# Patient Record
Sex: MALE | Race: WHITE | HISPANIC OR LATINO
[De-identification: names, ages, dates, MRNs, and addresses within clinical notes are randomized per-mention and may not be internally consistent; named-entity substitution may affect disease eponyms.]

---

## 2017-05-17 ENCOUNTER — TRANSCRIPTION ENCOUNTER (OUTPATIENT)
Age: 42
End: 2017-05-17

## 2017-05-17 PROBLEM — Z00.00 ENCOUNTER FOR PREVENTIVE HEALTH EXAMINATION: Status: ACTIVE | Noted: 2017-05-17

## 2017-05-23 ENCOUNTER — APPOINTMENT (OUTPATIENT)
Dept: CT IMAGING | Facility: CLINIC | Age: 42
End: 2017-05-23

## 2022-07-10 ENCOUNTER — EMERGENCY (EMERGENCY)
Facility: HOSPITAL | Age: 47
LOS: 1 days | Discharge: HOME | End: 2022-07-10
Attending: EMERGENCY MEDICINE
Payer: COMMERCIAL

## 2022-07-10 VITALS
RESPIRATION RATE: 16 BRPM | OXYGEN SATURATION: 99 % | WEIGHT: 205.03 LBS | SYSTOLIC BLOOD PRESSURE: 144 MMHG | HEIGHT: 68 IN | TEMPERATURE: 98 F | DIASTOLIC BLOOD PRESSURE: 90 MMHG | HEART RATE: 78 BPM

## 2022-07-10 PROCEDURE — ZZZZZ: CPT

## 2022-07-10 PROCEDURE — 99285 EMERGENCY DEPT VISIT HI MDM: CPT

## 2022-07-10 PROCEDURE — 99222 1ST HOSP IP/OBS MODERATE 55: CPT

## 2022-07-10 PROCEDURE — 99281 EMR DPT VST MAYX REQ PHY/QHP: CPT

## 2022-07-10 NOTE — PROCEDURE NOTE - ADDITIONAL PROCEDURE DETAILS
The area was prepped and draped in the standard sterile fashion with Betadine.  The sutures were removed.  Devitalized skin and subcutaneous tissues down to the level of the adductor muscle were debrided carefully with a scalpel.  A small amount of pus was expressed.  It was then irrigated with 1 L of normal saline.  It was packed with iodoform pack and wrapped with gauze Alexandra and an Ace bandage.  He tolerated the procedure well.

## 2022-07-10 NOTE — ED ADULT TRIAGE NOTE - CHIEF COMPLAINT QUOTE
pt had carpal tunnel surgery done last week, now incision site red, swollen and draining pus  started on keflex 2 days ago by urgent care

## 2022-07-11 DIAGNOSIS — Z98.890 OTHER SPECIFIED POSTPROCEDURAL STATES: Chronic | ICD-10-CM

## 2022-07-11 LAB
ALBUMIN SERPL ELPH-MCNC: 4.6 G/DL — SIGNIFICANT CHANGE UP (ref 3.3–5)
ALP SERPL-CCNC: 68 U/L — SIGNIFICANT CHANGE UP (ref 40–120)
ALT FLD-CCNC: 18 U/L — SIGNIFICANT CHANGE UP (ref 10–45)
ANION GAP SERPL CALC-SCNC: 11 MMOL/L — SIGNIFICANT CHANGE UP (ref 5–17)
APTT BLD: 34.4 SEC — SIGNIFICANT CHANGE UP (ref 27.5–35.5)
AST SERPL-CCNC: 24 U/L — SIGNIFICANT CHANGE UP (ref 10–40)
BASOPHILS # BLD AUTO: 0.06 K/UL — SIGNIFICANT CHANGE UP (ref 0–0.2)
BASOPHILS NFR BLD AUTO: 0.6 % — SIGNIFICANT CHANGE UP (ref 0–2)
BILIRUB SERPL-MCNC: 0.4 MG/DL — SIGNIFICANT CHANGE UP (ref 0.2–1.2)
BLD GP AB SCN SERPL QL: NEGATIVE — SIGNIFICANT CHANGE UP
BUN SERPL-MCNC: 21 MG/DL — SIGNIFICANT CHANGE UP (ref 7–23)
CALCIUM SERPL-MCNC: 9.1 MG/DL — SIGNIFICANT CHANGE UP (ref 8.4–10.5)
CHLORIDE SERPL-SCNC: 103 MMOL/L — SIGNIFICANT CHANGE UP (ref 96–108)
CO2 SERPL-SCNC: 25 MMOL/L — SIGNIFICANT CHANGE UP (ref 22–31)
CREAT SERPL-MCNC: 0.76 MG/DL — SIGNIFICANT CHANGE UP (ref 0.5–1.3)
CRP SERPL-MCNC: 25 MG/L — HIGH (ref 0–4)
EGFR: 113 ML/MIN/1.73M2 — SIGNIFICANT CHANGE UP
EOSINOPHIL # BLD AUTO: 0.27 K/UL — SIGNIFICANT CHANGE UP (ref 0–0.5)
EOSINOPHIL NFR BLD AUTO: 2.8 % — SIGNIFICANT CHANGE UP (ref 0–6)
ERYTHROCYTE [SEDIMENTATION RATE] IN BLOOD: 26 MM/HR — HIGH (ref 0–15)
GLUCOSE SERPL-MCNC: 115 MG/DL — HIGH (ref 70–99)
HCT VFR BLD CALC: 39.3 % — SIGNIFICANT CHANGE UP (ref 39–50)
HGB BLD-MCNC: 13.7 G/DL — SIGNIFICANT CHANGE UP (ref 13–17)
IMM GRANULOCYTES NFR BLD AUTO: 0.5 % — SIGNIFICANT CHANGE UP (ref 0–1.5)
INR BLD: 1 RATIO — SIGNIFICANT CHANGE UP (ref 0.88–1.16)
LYMPHOCYTES # BLD AUTO: 2.27 K/UL — SIGNIFICANT CHANGE UP (ref 1–3.3)
LYMPHOCYTES # BLD AUTO: 23.2 % — SIGNIFICANT CHANGE UP (ref 13–44)
MCHC RBC-ENTMCNC: 30.6 PG — SIGNIFICANT CHANGE UP (ref 27–34)
MCHC RBC-ENTMCNC: 34.9 GM/DL — SIGNIFICANT CHANGE UP (ref 32–36)
MCV RBC AUTO: 87.7 FL — SIGNIFICANT CHANGE UP (ref 80–100)
MONOCYTES # BLD AUTO: 0.76 K/UL — SIGNIFICANT CHANGE UP (ref 0–0.9)
MONOCYTES NFR BLD AUTO: 7.8 % — SIGNIFICANT CHANGE UP (ref 2–14)
NEUTROPHILS # BLD AUTO: 6.37 K/UL — SIGNIFICANT CHANGE UP (ref 1.8–7.4)
NEUTROPHILS NFR BLD AUTO: 65.1 % — SIGNIFICANT CHANGE UP (ref 43–77)
NRBC # BLD: 0 /100 WBCS — SIGNIFICANT CHANGE UP (ref 0–0)
PLATELET # BLD AUTO: 332 K/UL — SIGNIFICANT CHANGE UP (ref 150–400)
POTASSIUM SERPL-MCNC: 3.7 MMOL/L — SIGNIFICANT CHANGE UP (ref 3.5–5.3)
POTASSIUM SERPL-SCNC: 3.7 MMOL/L — SIGNIFICANT CHANGE UP (ref 3.5–5.3)
PROT SERPL-MCNC: 7.6 G/DL — SIGNIFICANT CHANGE UP (ref 6–8.3)
PROTHROM AB SERPL-ACNC: 11.5 SEC — SIGNIFICANT CHANGE UP (ref 10.5–13.4)
RBC # BLD: 4.48 M/UL — SIGNIFICANT CHANGE UP (ref 4.2–5.8)
RBC # FLD: 12.8 % — SIGNIFICANT CHANGE UP (ref 10.3–14.5)
RH IG SCN BLD-IMP: NEGATIVE — SIGNIFICANT CHANGE UP
RH IG SCN BLD-IMP: NEGATIVE — SIGNIFICANT CHANGE UP
SARS-COV-2 RNA SPEC QL NAA+PROBE: SIGNIFICANT CHANGE UP
SODIUM SERPL-SCNC: 139 MMOL/L — SIGNIFICANT CHANGE UP (ref 135–145)
WBC # BLD: 9.78 K/UL — SIGNIFICANT CHANGE UP (ref 3.8–10.5)
WBC # FLD AUTO: 9.78 K/UL — SIGNIFICANT CHANGE UP (ref 3.8–10.5)

## 2022-07-11 PROCEDURE — 73090 X-RAY EXAM OF FOREARM: CPT | Mod: 26,LT

## 2022-07-11 PROCEDURE — 99220: CPT

## 2022-07-11 PROCEDURE — 73110 X-RAY EXAM OF WRIST: CPT | Mod: 26,RT

## 2022-07-11 PROCEDURE — 71045 X-RAY EXAM CHEST 1 VIEW: CPT | Mod: 26

## 2022-07-11 PROCEDURE — 99203 OFFICE O/P NEW LOW 30 MIN: CPT

## 2022-07-11 RX ORDER — CEFAZOLIN SODIUM 1 G
2000 VIAL (EA) INJECTION ONCE
Refills: 0 | Status: COMPLETED | OUTPATIENT
Start: 2022-07-11 | End: 2022-07-11

## 2022-07-11 RX ORDER — CEFAZOLIN SODIUM 1 G
2000 VIAL (EA) INJECTION EVERY 8 HOURS
Refills: 0 | Status: DISCONTINUED | OUTPATIENT
Start: 2022-07-11 | End: 2022-07-11

## 2022-07-11 RX ORDER — LANOLIN ALCOHOL/MO/W.PET/CERES
5 CREAM (GRAM) TOPICAL AT BEDTIME
Refills: 0 | Status: DISCONTINUED | OUTPATIENT
Start: 2022-07-11 | End: 2022-07-14

## 2022-07-11 RX ORDER — VANCOMYCIN HCL 1 G
1000 VIAL (EA) INTRAVENOUS EVERY 8 HOURS
Refills: 0 | Status: DISCONTINUED | OUTPATIENT
Start: 2022-07-11 | End: 2022-07-12

## 2022-07-11 RX ADMIN — Medication 100 MILLIGRAM(S): at 04:35

## 2022-07-11 RX ADMIN — Medication 100 MILLIGRAM(S): at 03:40

## 2022-07-11 RX ADMIN — Medication 250 MILLIGRAM(S): at 14:36

## 2022-07-11 RX ADMIN — Medication 100 MILLIGRAM(S): at 06:40

## 2022-07-11 RX ADMIN — Medication 100 MILLIGRAM(S): at 12:12

## 2022-07-11 RX ADMIN — Medication 2000 MILLIGRAM(S): at 04:08

## 2022-07-11 RX ADMIN — Medication 250 MILLIGRAM(S): at 22:09

## 2022-07-11 NOTE — ED PROVIDER NOTE - PROGRESS NOTE DETAILS
Laurent PGY 2   Received sign out regarding patient, will follow up with labs, imaging.   Currently pending ortho recs will be admitted regardless for iv abx Laurent PGY 2 hosp rejected pt given post surgical complication     ortho rejected pt given current status states if needs more than 24 hour intervention will admit to ortho     will re-consult cdu for obs Laurent PGY 2 hosp rejected pt given post surgical complication     ortho rejected pt given current status states if needs more than 24 hour intervention will admit to ortho   Spoke w/ Dr Santhosh villavicencio     will re-consult cdu for obs

## 2022-07-11 NOTE — ED CDU PROVIDER DISPOSITION NOTE - NSFOLLOWUPINSTRUCTIONS_ED_ALL_ED_FT
1) Follow-up with your primary care provider in 1-2 days.      Follow-up with orthopedic hand surgery within 1 week.     Rosario Love; MPH)  Orthopaedic Surgery  [address] [facility], [address]  [place] [address]  Phone: [phone]  Fax: [phone]    2) Take Doxycycline 200mg as prescribed. Continue to take all medications as prescribed.    3) Rest and drink plenty of fluids. Pain can be managed with Acetaminophen (aka Tylenol) and Ibuprofen (aka Motrin or Advil) over the counter as directed. Take with food.    4) Return to the ER for any new or worsening symptoms. 1) Follow-up with your primary care provider in 2-3 days or our medicine clinic [phone]      Follow-up with your hand surgeon if in [place] or our orthopedic hand surgery within 1 week.     Rosario Love (MD; MPH)  Orthopaedic Surgery  [address] [place] [address], [address]  [place] [address]  Phone: [phone]  Fax: [phone]    2) Take Keflex 500mg 4x/day for 7 days. Continue to take all medications as prescribed.    3) Rest and drink plenty of fluids. Pain can be managed with Acetaminophen (aka Tylenol) and Ibuprofen (aka Motrin or Advil) over the counter as directed. Take with food.    4) Return to the ER for any new or worsening symptoms. return for fever, weakness, spreading redness and all other concerns.

## 2022-07-11 NOTE — ED CDU PROVIDER INITIAL DAY NOTE - PROGRESS NOTE DETAILS
pt seen by ID, rec only vanco 1g q8hr CDU PROGRESS NOTE PA SHYANN: Received pt at 1900 sign-out. Case/plan reviewed. VSS. s/p right wrist carpal tunnel surgery with postop wound infection, right forearm cellulitis. On exam, old dressing removed. No exposed bone or tendon noted. +Erythema over surgical site and up the forearm with notable swelling to the volar and dorsal aspects of the forearm. Swelling present to the forearm. No deep space infection to the hand, no erythema to the hand, no discharge. ID recs appreciated,   vancomycin 1 gm iv q8 for cover mrsa/strep, f/u blood and wound cx, Ortho following. CDU PROGRESS NOTE PA SHYANN: Received pt at 1900 sign-out. Case/plan reviewed. VSS. s/p right wrist carpal tunnel surgery with postop wound infection, right forearm cellulitis. On exam, old dressing removed. No exposed bone or tendon noted. +Erythema over surgical site and up the forearm with notable swelling to the volar and dorsal aspects of the forearm. Swelling present to the forearm. No deep space infection to the hand, no erythema to the hand, no discharge. ID recs appreciated, vancomycin 1 gm iv q8 for cover mrsa/strep, f/u blood and wound cx, Ortho following.

## 2022-07-11 NOTE — ED ADULT NURSE REASSESSMENT NOTE - NS ED NURSE REASSESS COMMENT FT1
10.00Am Received the Pt from  ANNIE Irene . Pt is Observed for Rt hand surgical site infection  Received the Pt A&OX 4 obeys commands Peg N/V/D fever chills cp SOB   Comfort care & safety measures continued  IV site looks clean & dry no signs of infiltration noted pt denies  pain IV site .  Pt is advised to call for help  call bell with in the reach pt verbalized the understanding . Pt states  he has minimal pain in Rt hand 4/10  Surgical site is wrapped in Ace wrap + swelling  pt able to wriggle the fingers  pending CDU  MD delatorre . GCS 15/15 A&OX 4 PERRLA  size 3 Strong upper & lower extremities steady gait   No facial droop  No Hand Leg drop denies numbness tingling Continue to monitor

## 2022-07-11 NOTE — ED PROVIDER NOTE - OBJECTIVE STATEMENT
Attending note (Daron): 46 y/o M with recent carpal tunnel release (7/5; Alcides Chester; @ in [place]), noted swelling of the wrist since yesterday, and then today noted pus coming out.  No fever.  Redness tracking up arm to almost elbow.  Visiting family in [place].  Went to urgent care center 2 days ago when it first started getting swollen and was prescribed keflex (500mg, taking it twice a day for past 2 days).    PMH: none  PSH: nasal surgery  allergies: nkda  nonsmoker

## 2022-07-11 NOTE — ED PROVIDER NOTE - PHYSICAL EXAMINATION
On Physical Exam:  General: well appearing, in NAD, speaking clearly in full sentences and without difficulty; cooperative with exam  HEENT: anicteric sclera,   Neck: no neck tenderness, no nuchal rigidity  Cardiac: normal s1, s2; RRR; no MGR  Lungs: CTABL  Abdomen: soft nontender/nondistended  : no bladder tenderness or distension  Skin: intact, no rash  Extremities: RUE: derythema tracking up the right medial palmar surface of forearm, induration/swelling, ? fluctuance of the right wrist at incision; purulent discharge xpressed with minimal palpation of right wrist.  Neuro: no gross neurologic deficits On Physical Exam:  General: well appearing, in NAD, speaking clearly in full sentences and without difficulty; cooperative with exam  HEENT: anicteric sclera,   Neck: no neck tenderness, no nuchal rigidity  Cardiac: normal s1, s2; RRR; no MGR  Lungs: CTABL  Abdomen: soft nontender/nondistended  : no bladder tenderness or distension  Skin: intact, no rash  Extremities: RUE: derythema tracking up the right medial palmar surface of forearm, induration/swelling, ? fluctuance of the right wrist at incision; purulent discharge expressed with minimal palpation of right wrist.  Neuro: no gross neurologic deficits

## 2022-07-11 NOTE — ED CDU PROVIDER INITIAL DAY NOTE - OBJECTIVE STATEMENT
Attending note (Daron): 48 y/o M with recent carpal tunnel release (6/28; Alcides Chester; @ in [place]), noted swelling of the wrist since yesterday, and then today noted pus coming out.  No fever.  Redness tracking up arm to almost elbow.  Visiting family in [place].  Went to urgent care center 2 days ago when it first started getting swollen and was prescribed keflex (500mg, taking it twice a day for past 2 days).  In ED, cbc/cmp wnl, crp 25, esr 26. No evidence of osteo or gas on xr. I&D done by ortho/hand. Given doxy and ancef in ED. CDU for iv abx, ID cx, hand following. RUE cellulitis outlined w/ skin marker upon arrival to CDU. 48 y/o M with recent carpal tunnel release (6/28; Alcides Chester; @ in [place]), noted swelling of the wrist since yesterday, and then today noted pus coming out.  No fever.  Redness tracking up arm to almost elbow.  Visiting family in [place].  Went to urgent care center 2 days ago when it first started getting swollen and was prescribed keflex (500mg, taking it twice a day for past 2 days).  In ED, cbc/cmp wnl, crp 25, esr 26. No evidence of osteo or gas on xr. I&D done by ortho/hand. Given doxy and ancef in ED. CDU for iv abx, ID cx, hand following. RUE cellulitis outlined w/ skin marker upon arrival to CDU.

## 2022-07-11 NOTE — CONSULT NOTE ADULT - SUBJECTIVE AND OBJECTIVE BOX
Source of information: patient    Reason for consult: infected surgical wound     HPI:  Patient is a 47y old male with no previous PMHx presents with a chief complaint of erythema, swelling and purulent discharge from the site of the carpal tunnel surgery incision site of his right arm that was done in [place] 6/28/22. Patient reports having a follow up with his orthopedics few days after his surgery when his bandage was removed. Patient came in to [place] on 7/3 for vacation, was feeling well until 2 days ago when he started noticing erythema and swelling around the site of the surgery. Patient went to urgent care 2 days ago when he was prescribed Keflex. Yesterday, patient started noticing purulent discharge from he site of the incision which prompted him to come to ER.     Denied any fever, chills, right arm numbness, weakness, or any other symptoms.   Denied any trauma to right arm, any known tick bite, any recent swimming. However, reports having outdoor barbecues during his stay in [place].   He denied smoking cigarettes, using any IV drugs.         REVIEW OF SYSTEMS  [  ] ROS unobtainable because:    [  ] All other systems negative except as noted below    Constitutional:  [ ] fever [ ] chills  [ ] weight loss  [ ]night sweat  [ ]poor appetite/PO intake [ ]fatigue   Skin:  [ ] rash [ ] phlebitis	  Eyes: [ ] icterus [ ] pain  [ ] discharge	  ENMT: [ ] sore throat  [ ] thrush [ ] ulcers [ ] exudates [ ]anosmia  Respiratory: [ ] dyspnea [ ] hemoptysis [ ] cough [ ] sputum	  Cardiovascular:  [ ] chest pain [ ] palpitations [ ] edema	  Gastrointestinal:  [ ] nausea [ ] vomiting [ ] diarrhea [ ] constipation [ ] pain	  Genitourinary:  [ ] dysuria [ ] frequency [ ] hematuria [ ] discharge [ ] flank pain  [ ] incontinence  Musculoskeletal:  [ ] myalgias [ ] arthralgias [ ] arthritis  [ ] back pain [x] right arm swelling, erythema and pain  Neurological:  [ ] headache [ ] weakness [ ] seizures  [ ] confusion/altered mental status    prior hospital charts reviewed [V]  primary team notes reviewed [V]  other consultant notes reviewed [V]    PAST MEDICAL & SURGICAL HISTORY:      SOCIAL HISTORY:  - Denied smoking/vaping/alcohol/recreational drug use    FAMILY HISTORY:      Allergies  No Known Allergies        ANTIMICROBIALS:  vancomycin  IVPB 1000 every 8 hours      ANTIMICROBIALS (past 90 days):  MEDICATIONS  (STANDING):  ceFAZolin   IVPB   100 mL/Hr IV Intermittent (07-11-22 @ 03:40)    ceFAZolin   IVPB   100 mL/Hr IV Intermittent (07-11-22 @ 12:12)    doxycycline IVPB   100 mL/Hr IV Intermittent (07-11-22 @ 04:35)        OTHER MEDS:   MEDICATIONS  (STANDING):      VITALS:  Vital Signs Last 24 Hrs  T(F): 98.4 (07-11-22 @ 12:31), Max: 98.4 (07-11-22 @ 12:31)    Vital Signs Last 24 Hrs  HR: 81 (07-11-22 @ 12:31) (67 - 81)  BP: 146/96 (07-11-22 @ 12:31) (144/90 - 157/91)  RR: 18 (07-11-22 @ 12:31)  SpO2: 97% (07-11-22 @ 12:31) (97% - 100%)  Wt(kg): --    EXAM:  Physical Exam:  Constitutional:  well preserved, comfortable  Head/Eyes: no icterus, PERRL, EOMI  ENT:  supple; no thrush  LUNGS:  CTA  CVS:  normal S1, S2, no murmur  Abd:  soft, non-tender; non-distended  Ext:  right wrist incision site surrounded with erythema and swelling. Erythema extended up to arm (borders marked)   Vascular:  IV site no erythema tenderness or discharge  MSK:  joints without swelling  Neuro: AAO X 3, non- focal    Labs:                        13.7   9.78  )-----------( 332      ( 11 Jul 2022 03:35 )             39.3     07-11    139  |  103  |  21  ----------------------------<  115<H>  3.7   |  25  |  0.76    Ca    9.1      11 Jul 2022 03:35    TPro  7.6  /  Alb  4.6  /  TBili  0.4  /  DBili  x   /  AST  24  /  ALT  18  /  AlkPhos  68  07-11      WBC Trend:  WBC Count: 9.78 (07-11-22 @ 03:35)      Auto Neutrophil #: 6.37 K/uL (07-11-22 @ 03:35)      Creatine Trend:  Creatinine, Serum: 0.76 (07-11)      Liver Biochemical Testing Trend:  Alanine Aminotransferase (ALT/SGPT): 18 (07-11)  Aspartate Aminotransferase (AST/SGOT): 24 (07-11-22 @ 03:35)  Bilirubin Total, Serum: 0.4 (07-11)      Trend LDH      Auto Eosinophil %: 2.8 % (07-11-22 @ 03:35)          MICROBIOLOGY:  COVID-19 PCR: NotDetec (07-11-22 @ 03:35)          C-Reactive Protein, Serum: 25 (07-11)              Blood Gas Venous - Lactate: 1.3 (07-11 @ 03:26)        RADIOLOGY:  imaging below personally reviewed    < from: Xray Wrist 3 Views, Right (07.11.22 @ 04:31) >  IMPRESSION:    Soft tissue swelling throughout the right forearm without radiographic   evidence of osteomyelitis or subcutaneous tracking gas.    --- End of Report ---    < end of copied text >

## 2022-07-11 NOTE — ED CDU PROVIDER DISPOSITION NOTE - PATIENT PORTAL LINK FT
You can access the FollowMyHealth Patient Portal offered by [facility] by registering at the following website: [email or web address]. By joining [employer]’s FollowMyHealth portal, you will also be able to view your health information using other applications (apps) compatible with our system.

## 2022-07-11 NOTE — ED ADULT NURSE REASSESSMENT NOTE - NS ED NURSE REASSESS COMMENT FT1
Pt received from ANNIE Lee. Pt oriented to CDU & plan of care was discussed. Pt A&O x 4. Pt in CDU for IV abx, ID cx, Ortho/hand following. Pt has wound to right wrist, area covered with dressing. Pt denies any right arm pain, chills, or fever. V/S stable, pt afebrile,  IV in place, patent and free of signs of infiltration. Pt resting in bed. Safety & comfort measures maintained. Call bell in reach. Will continue to monitor.

## 2022-07-11 NOTE — ED ADULT NURSE NOTE - OBJECTIVE STATEMENT
48y/o male A&Ox3 speaking coherently independent p/w red, swollen, and pus draining incision site on R inner wrist s/p carpal tunnel surgery last week. Was started on keflex 2 days ago by urgent care. Pt states he came to ER today because it started draining pus. Denies any fevers. Does not appear to be in any acute distress. Safety and comfort measures provided. Bed locked and in lowest position, side rails up for safety. Call bell within reach.

## 2022-07-11 NOTE — ED CDU PROVIDER DISPOSITION NOTE - CLINICAL COURSE
46 y/o M with recent carpal tunnel release (6/28; Alcides Chester; @ in [place]), noted swelling of the wrist since yesterday, and then today noted pus coming out.  No fever.  Redness tracking up arm to almost elbow.  Visiting family in [place].  Went to urgent care center 2 days ago when it first started getting swollen and was prescribed keflex (500mg, taking it twice a day for past 2 days).  In ED, cbc/cmp wnl, crp 25, esr 26. No evidence of osteo or gas on xr. I&D done by ortho/hand. Given doxy and ancef in ED. CDU for iv abx, ID cx, hand following. RUE cellulitis outlined w/ skin marker upon arrival to CDU.  In CDU, 46 y/o M with recent carpal tunnel release (6/28; Alcides Chester; @ in [place]), noted swelling of the wrist since yesterday, and then today noted pus coming out.  No fever.  Redness tracking up arm to almost elbow.  Visiting family in [place].  Went to urgent care center 2 days ago when it first started getting swollen and was prescribed keflex (500mg, taking it twice a day for past 2 days).  In ED, cbc/cmp wnl, crp 25, esr 26. No evidence of osteo or gas on xr. I&D done by ortho/hand. Given doxy and ancef in ED. CDU for iv abx, ID cx, hand following. RUE cellulitis outlined w/ skin marker upon arrival to CDU.  In CDU, pt did well, significantly improved on abx, cleared by hand and ID to f/up outpt

## 2022-07-11 NOTE — ED PROVIDER NOTE - ATTENDING CONTRIBUTION TO CARE
Attending note (Daron): 48 y/o M with recent carpal tunnel release (7/5; Alcides Chester; @ in [place]), noted swelling of the wrist since yesterday, and then today noted pus coming out.  On exam, erythema, induration over ventral aspect of R wrist with purulent discharge expressed, concern for infection, possible abscess at surgical site; consulting ortho service, starting empiric antibiotics, sending wound culture, blood culture and screening labs: cbc (to evaluate for leukocytosis or anemia), CMP (to evaluate for electrolyte abnormalities or renal/liver dysfunction) and esr/crp. Disposition pending discussion with hand surgery consultation.

## 2022-07-11 NOTE — ED ADULT NURSE NOTE - ED STAT RN HANDOFF DETAILS 3
Report given to receiving change of shift ANA LOPEZ  patient is in no acute distress. Patient vital signs stable, plan of care explained.

## 2022-07-11 NOTE — ED PROVIDER NOTE - CLINICAL SUMMARY MEDICAL DECISION MAKING FREE TEXT BOX
Attending note (Daron): 46 y/o M with recent carpal tunnel release (7/5; Alcides Chester; @ in [place]), noted swelling of the wrist since yesterday, and then today noted pus coming out.  On exam, erythema, induration over ventral aspect of R wrist with purulent discharge expressed, concern for infection, possible abscess at surgical site; consulting ortho service, starting empiric antibiotics, sending wound culture, blood culture and screening labs: cbc (to evaluate for leukocytosis or anemia), CMP (to evaluate for electrolyte abnormalities or renal/liver dysfunction) and esr/crp. Disposition pending discussion with hand surgery consultation.

## 2022-07-11 NOTE — CONSULT NOTE ADULT - SUBJECTIVE AND OBJECTIVE BOX
Orthopedics Consult Note:    This is a 47y Male who presents to the ED today with c/o right hand/forearm swelling and erythema for 2 day s/p R carpal tunnel surgery in [place] 2 weeks ago. Pt reports he is up visiting for vacation and 2 days ago noticed increase erythema and swelling to the surgical site and up the forearm. Pt denies any numbness, tingling or paresthesias. Pt denies any loss of forearm, wrist, hand or finger ROM. Pt denies any other injuries. Pt is RHD.    Wound cultures taken by the ED team.    Past Medical & Surgical History:  MEWS Score    DRAINAGE FROM SURGICAL SITE    [email or web address]        Allergies:  No Known Allergies      Vital Signs:  T(C): 36.7 (07-11-22 @ 04:00), Max: 36.7 (07-10-22 @ 23:52)  HR: 67 (07-11-22 @ 04:00) (67 - 78)  BP: 157/91 (07-11-22 @ 04:00) (144/90 - 157/91)  RR: 17 (07-11-22 @ 04:00) (16 - 17)  SpO2: 100% (07-11-22 @ 04:00) (99% - 100%)      Imaging:  X-rays of the right hand demonstrate no evidence of fracture, dislocation or actue bony injury.    PE right hand/forearm:  Surgical site of the volar aspect of the wrist with nylons present. Expressible pus from the wound. No exposed bone or tendon noted.  Erythema over surgical site and up the forearm with notable swelling to the volar and dorsal aspects of the forearm. Crepitus present to the forearm.  No deep space infection to the hand, no erythema to the hand.   +AIN/PIN/Radial Nerve/Median Nerve/Ulnar Nerve/Musculocutaneous Nerve.   Moving all fingers; full flexion/extension at wrist, MCPs and all IP joints.   Sensation intact.   Radial pulse 2+, cap refill <2secs.    Secondary Survey:   No TTP over bony prominences, SILT, palpable pulses, full/painless A/PROM, compartments soft. No TTP over spinous processes or paraspinal muscles at C/T/L spine. No palpable step off. No other injuries or complaints.    Assessment:  47y Male with a right wound infection s/p R Carpal tunnel      Plan:  NPO/Hold DVT ppx  Hold abx prior to OR  Pain control.  Likely OR today to I&D of R Forearm  Will discuss with Dr Love and advise of definitive plan          Orthopedics Consult Note:    This is a 47y Male who presents to the ED today with c/o right hand/forearm swelling and erythema for 2 day s/p R carpal tunnel surgery in [place] 2 weeks ago. Pt reports he is up visiting for vacation and 2 days ago noticed increase erythema and swelling to the surgical site and up the forearm. Pt denies any numbness, tingling or paresthesias. Pt denies any loss of forearm, wrist, hand or finger ROM. Pt denies any other injuries. Pt is RHD.    Wound cultures taken by the ED team.    Past Medical & Surgical History:  MEWS Score    DRAINAGE FROM SURGICAL SITE    [email or web address]        Allergies:  No Known Allergies      Vital Signs:  T(C): 36.7 (07-11-22 @ 04:00), Max: 36.7 (07-10-22 @ 23:52)  HR: 67 (07-11-22 @ 04:00) (67 - 78)  BP: 157/91 (07-11-22 @ 04:00) (144/90 - 157/91)  RR: 17 (07-11-22 @ 04:00) (16 - 17)  SpO2: 100% (07-11-22 @ 04:00) (99% - 100%)      Imaging:  X-rays of the right hand demonstrate no evidence of fracture, dislocation or actue bony injury.    PE right hand/forearm:  Surgical site of the volar aspect of the wrist with nylons present. Expressible pus from the wound. No exposed bone or tendon noted.  Erythema over surgical site and up the forearm with notable swelling to the volar and dorsal aspects of the forearm. Crepitus present to the forearm.  No deep space infection to the hand, no erythema to the hand.   +AIN/PIN/Radial Nerve/Median Nerve/Ulnar Nerve/Musculocutaneous Nerve.   Moving all fingers; full flexion/extension at wrist, MCPs and all IP joints.   Sensation intact.   Radial pulse 2+, cap refill <2secs.    Secondary Survey:   No TTP over bony prominences, SILT, palpable pulses, full/painless A/PROM, compartments soft. No TTP over spinous processes or paraspinal muscles at C/T/L spine. No palpable step off. No other injuries or complaints.    Assessment:  47y Male with a right wound infection s/p R Carpal tunnel      Plan:  Plan for Bedside I&D  ID for abx recommendations  Admit to medicine/CDU for monitoring   No plan for OR at this time  Pain control.  TITI KEY  Discussed with Dr Love who is aware and agrees with the above         Orthopedics Consult Note:    This is a 47y Male who presents to the ED today with c/o right hand/forearm swelling and erythema for 2 day s/p R carpal tunnel surgery in [place] 2 weeks ago. Pt reports he is up visiting for vacation and 2 days ago noticed increase erythema and swelling to the surgical site and up the forearm. Pt denies any numbness, tingling or paresthesias. Pt denies any loss of forearm, wrist, hand or finger ROM. Pt denies any other injuries. Pt is RHD.    Wound cultures taken by the ED team.    Past Medical & Surgical History:  MEWS Score    DRAINAGE FROM SURGICAL SITE    [email or web address]        Allergies:  No Known Allergies      Vital Signs:  T(C): 36.7 (07-11-22 @ 04:00), Max: 36.7 (07-10-22 @ 23:52)  HR: 67 (07-11-22 @ 04:00) (67 - 78)  BP: 157/91 (07-11-22 @ 04:00) (144/90 - 157/91)  RR: 17 (07-11-22 @ 04:00) (16 - 17)  SpO2: 100% (07-11-22 @ 04:00) (99% - 100%)      Imaging:  X-rays of the right hand demonstrate no evidence of fracture, dislocation or actue bony injury.    PE right hand/forearm:  Surgical site of the volar aspect of the wrist with nylons present. Expressible pus from the wound. No exposed bone or tendon noted.  Erythema over surgical site and up the forearm with notable swelling to the volar and dorsal aspects of the forearm. Swelling present to the forearm.  No deep space infection to the hand, no erythema to the hand.   +AIN/PIN/Radial Nerve/Median Nerve/Ulnar Nerve/Musculocutaneous Nerve.   Moving all fingers; full flexion/extension at wrist, MCPs and all IP joints.   Sensation intact.   Radial pulse 2+, cap refill <2secs.    Secondary Survey:   No TTP over bony prominences, SILT, palpable pulses, full/painless A/PROM, compartments soft. No TTP over spinous processes or paraspinal muscles at C/T/L spine. No palpable step off. No other injuries or complaints.    Assessment:  47y Male with a right wound infection s/p R Carpal tunnel      Plan:  Plan for Bedside I&D  ID for abx recommendations  Admit to medicine/CDU for monitoring   No plan for OR at this time  Pain control.  TITI KEY  Discussed with Dr Love who is aware and agrees with the above

## 2022-07-11 NOTE — CONSULT NOTE ADULT - ASSESSMENT
Patient is a 47y old male with no previous PMHx presents with a chief complaint of erythema, swelling and purulent discharge from the site of the carpal tunnel surgery incision site of his right arm that was done in [place] 6/28/22.     #right carpal tunnel surgical site infection  #R/O MRSA infection     Recommendations:  -Start vancomycin IV 1 g q 8hrs   -Follow Vanco trough before the 4th dose   -Discontinue Doxycycline and Cefazolin  -Send MRSA PCR   -Follow blood culture  -Follow surgical wound culture       Laura Isaac MD, PGY4  ID fellow  [facility] Teams Preferred  After 5pm/weekends call [phone]

## 2022-07-11 NOTE — ED PROVIDER NOTE - NS ED ROS FT
Review of Systems:  -General: no fever or chills  -ENT: no congestion  -Pulmonary: no cough, no shortness of breath  -Cardiac: no chest pain  -Gastrointestinal: no abdominal pain, no nausea, no vomiting  -Genitourinary: no blood or pain with urination  -Musculoskeletal: no back or neck pain; R wrist redness/swelling  -Skin: no rashes  -Endocrine: No h/o diabetes  -Neurologic: No new weakness or numbness in extremities; no numbness/tingling in fingers    All else negative unless otherwise specified elsewhere in this note.

## 2022-07-11 NOTE — ED CDU PROVIDER INITIAL DAY NOTE - ATTENDING APP SHARED VISIT CONTRIBUTION OF CARE
attending Osmel: pt with Postop R wrist cellulitis. Plan for CDU for IV abx, ID consult, ortho/hand following

## 2022-07-11 NOTE — ED CDU PROVIDER INITIAL DAY NOTE - PHYSICAL EXAMINATION
GEN: Pt well appearing, non-toxic in NAD, A&Ox3.  PSYCH: Affect and mood appropriate.  EYES: Sclera white w/o injection.  ENT: Neck supple FROM. Airway patent.  RESP: CTA b/l, no wheezes, rales, or rhonchi.   CARDIAC: RRR, clear distinct S1, S2, no appreciable murmurs.  ABD: Abdomen soft, non-tender.  MSK: Moving all extremities.  NEURO: No focal motor or sensory deficits.  VASC: Radial and dorsalis pedis pulses 2+ b/l. No edema or calf tenderness.  SKIN: RUE open I&D incisional wound whyte aspect of wrist w/ surrounding induration and erythema to palm of hand and up forearm not crossing the AC fossa. Incision draining small amount of blood.

## 2022-07-11 NOTE — CONSULT NOTE ADULT - ATTENDING COMMENTS
s/p I&D, improving.   Patient discussed with resident.  Films reviewed.  Agree with assessment and plan.

## 2022-07-11 NOTE — ED CDU PROVIDER INITIAL DAY NOTE - NS ED ATTENDING STATEMENT MOD
This was a shared visit with the NICHOLAS. I reviewed and verified the documentation and independently performed the documented:

## 2022-07-11 NOTE — CONSULT NOTE ADULT - ATTENDING COMMENTS
47M s/p right wrist carpal tunnel surgery with postop wound infection, right forearm cellulitis   -change abx to vancomycin 1 gm iv q8 for cover mrsa/strep  -f/u blood and wound cx  -not septic  -local care per primo Alexis  Attending Physician   Division of Infectious Disease  Office #[phone]  Available on [employer] Teams also  After 5pm/weekend or no response, call #[phone]    D/w KATHIA MEDINA

## 2022-07-12 LAB
ANION GAP SERPL CALC-SCNC: 13 MMOL/L — SIGNIFICANT CHANGE UP (ref 5–17)
BASOPHILS # BLD AUTO: 0.05 K/UL — SIGNIFICANT CHANGE UP (ref 0–0.2)
BASOPHILS NFR BLD AUTO: 0.6 % — SIGNIFICANT CHANGE UP (ref 0–2)
BUN SERPL-MCNC: 13 MG/DL — SIGNIFICANT CHANGE UP (ref 7–23)
CALCIUM SERPL-MCNC: 9.5 MG/DL — SIGNIFICANT CHANGE UP (ref 8.4–10.5)
CHLORIDE SERPL-SCNC: 101 MMOL/L — SIGNIFICANT CHANGE UP (ref 96–108)
CO2 SERPL-SCNC: 25 MMOL/L — SIGNIFICANT CHANGE UP (ref 22–31)
CREAT SERPL-MCNC: 0.74 MG/DL — SIGNIFICANT CHANGE UP (ref 0.5–1.3)
EGFR: 114 ML/MIN/1.73M2 — SIGNIFICANT CHANGE UP
EOSINOPHIL # BLD AUTO: 0.22 K/UL — SIGNIFICANT CHANGE UP (ref 0–0.5)
EOSINOPHIL NFR BLD AUTO: 2.8 % — SIGNIFICANT CHANGE UP (ref 0–6)
GLUCOSE SERPL-MCNC: 110 MG/DL — HIGH (ref 70–99)
HCT VFR BLD CALC: 41.3 % — SIGNIFICANT CHANGE UP (ref 39–50)
HGB BLD-MCNC: 14 G/DL — SIGNIFICANT CHANGE UP (ref 13–17)
IMM GRANULOCYTES NFR BLD AUTO: 0.6 % — SIGNIFICANT CHANGE UP (ref 0–1.5)
LYMPHOCYTES # BLD AUTO: 1.7 K/UL — SIGNIFICANT CHANGE UP (ref 1–3.3)
LYMPHOCYTES # BLD AUTO: 21.5 % — SIGNIFICANT CHANGE UP (ref 13–44)
MCHC RBC-ENTMCNC: 29.5 PG — SIGNIFICANT CHANGE UP (ref 27–34)
MCHC RBC-ENTMCNC: 33.9 GM/DL — SIGNIFICANT CHANGE UP (ref 32–36)
MCV RBC AUTO: 86.9 FL — SIGNIFICANT CHANGE UP (ref 80–100)
MONOCYTES # BLD AUTO: 0.72 K/UL — SIGNIFICANT CHANGE UP (ref 0–0.9)
MONOCYTES NFR BLD AUTO: 9.1 % — SIGNIFICANT CHANGE UP (ref 2–14)
MRSA PCR RESULT.: SIGNIFICANT CHANGE UP
NEUTROPHILS # BLD AUTO: 5.16 K/UL — SIGNIFICANT CHANGE UP (ref 1.8–7.4)
NEUTROPHILS NFR BLD AUTO: 65.4 % — SIGNIFICANT CHANGE UP (ref 43–77)
NRBC # BLD: 0 /100 WBCS — SIGNIFICANT CHANGE UP (ref 0–0)
PLATELET # BLD AUTO: 322 K/UL — SIGNIFICANT CHANGE UP (ref 150–400)
POTASSIUM SERPL-MCNC: 4.4 MMOL/L — SIGNIFICANT CHANGE UP (ref 3.5–5.3)
POTASSIUM SERPL-SCNC: 4.4 MMOL/L — SIGNIFICANT CHANGE UP (ref 3.5–5.3)
RBC # BLD: 4.75 M/UL — SIGNIFICANT CHANGE UP (ref 4.2–5.8)
RBC # FLD: 12.7 % — SIGNIFICANT CHANGE UP (ref 10.3–14.5)
S AUREUS DNA NOSE QL NAA+PROBE: DETECTED
SODIUM SERPL-SCNC: 139 MMOL/L — SIGNIFICANT CHANGE UP (ref 135–145)
VANCOMYCIN TROUGH SERPL-MCNC: 6.5 UG/ML — LOW (ref 10–20)
WBC # BLD: 7.9 K/UL — SIGNIFICANT CHANGE UP (ref 3.8–10.5)
WBC # FLD AUTO: 7.9 K/UL — SIGNIFICANT CHANGE UP (ref 3.8–10.5)

## 2022-07-12 PROCEDURE — 99282 EMERGENCY DEPT VISIT SF MDM: CPT

## 2022-07-12 PROCEDURE — 99226: CPT

## 2022-07-12 RX ORDER — VANCOMYCIN HCL 1 G
500 VIAL (EA) INTRAVENOUS ONCE
Refills: 0 | Status: COMPLETED | OUTPATIENT
Start: 2022-07-12 | End: 2022-07-12

## 2022-07-12 RX ORDER — VANCOMYCIN HCL 1 G
1250 VIAL (EA) INTRAVENOUS EVERY 8 HOURS
Refills: 0 | Status: DISCONTINUED | OUTPATIENT
Start: 2022-07-12 | End: 2022-07-14

## 2022-07-12 RX ADMIN — Medication 250 MILLIGRAM(S): at 06:13

## 2022-07-12 RX ADMIN — Medication 100 MILLIGRAM(S): at 15:56

## 2022-07-12 RX ADMIN — Medication 166.67 MILLIGRAM(S): at 22:17

## 2022-07-12 RX ADMIN — Medication 250 MILLIGRAM(S): at 14:20

## 2022-07-12 RX ADMIN — Medication 5 MILLIGRAM(S): at 23:42

## 2022-07-12 RX ADMIN — Medication 5 MILLIGRAM(S): at 00:12

## 2022-07-12 NOTE — ED CDU PROVIDER SUBSEQUENT DAY NOTE - ATTENDING APP SHARED VISIT CONTRIBUTION OF CARE
Attending MD Contreras:   I personally have seen and examined this patient.  Physician assistant note reviewed and agree on plan of care and except where noted.  See below for details.     Seen in CDU [facility] 47    47M with PMH/PSH including s/p carpal tunnel release (7/5/22, Dr. Alcides Kirkland in [place]) sent to the CDU after presenting to the ED with purulent discharge, erythema and streaking from surgical site at R ventral wrist.  Patient reports hand feels markedly improved.  Denies fevers, chills. Denies chest pain, shortness of breath, abdominal pain, nausea, vomiting, diarrhea, urinary complaints.     Exam:   General: NAD  HENT: head NCAT, airway patent   Chest: symmetric chest rise, no increased work of breathing  MSK: ranging neck freely  Neuro: moving all extremities spontaneously, sensory grossly intact, no gross neuro deficits  Psych: normal mood and affect   Skin: markedly improved erythema, receded from initial markings, now just localized to the immediate shadi-incisional area in an area about 4cm x 3cm, no purulence expressed, moving all fingers, FROM elbow, +2 radials    A/P: 47M with surgical site infection, s/p I&D by ortho, continue IV abx, being followed by Ortho, will await ID

## 2022-07-12 NOTE — ED ADULT NURSE REASSESSMENT NOTE - SYMPTOMS
The patient presents with a history of chronic tonsillitis and tonsil hypertrophy.  The patient has had chronic problems with these difficulties over the past two years and she recently was treated in [place] for a severe acute tonsillitis.  The patient responds to medical therapy with antibiotics, but the patient's symptoms will soon recur after the medications are completed.  The patient denies sinusitis, rhinitis, facial pain, nasal obstruction or purulent nasal discharge. The patient denies otalgia, otorrhea, eustachian tube dysfunction, ear infections, dizziness or tinnitus.     This patient is seen in consultation as a self referral to my clinic.    All other systems were reviewed and they are either negative or they are not directly pertinent to this Otolaryngology examination.      Past Medical History:    History reviewed. No pertinent past medical history.    Past Surgical History:    History reviewed. No pertinent surgical history.    Medications:      Current Outpatient Prescriptions:      acetaminophen-codeine 120-12 MG/5ML solution, Take 5 mLs by mouth every 6 hours as needed for moderate pain, Disp: 70 mL, Rfl: 0     etonogestrel (NEXPLANON) 68 MG IMPL, 1 each by Subdermal route once, Disp: , Rfl:     Allergies:    Latex    Physical Examination:    The patient is a well developed, well nourished female in no apparent distress.  She is normocephalic, atraumatic with pupils equally round and reactive to light.    Oral Cavity Examination: Norml mucosa with no masses or lesions.  The tonsils are 3+ and cryptic  Nasal Examination: Normal mucosa with no masses or lesions  Ear Examination: Ear canals clear, tympanic membranes and middle ears normal  Neurological: Facial nerve function intact and symmetric  Integumentary: No lesions on the skin of the head or neck  Neck: No masses or lesions, no lymphadenopathy  Endocrine: Normal thyroid    Assessment and Plan:    The patient presents with a history of 
chronic tonsillitis and tonsillar hypertrophy.  The patient and I have discussed medical and surgical treatment alternatives. She will gargle with CREST mouth rinse gargles twice daily for two months and she will be seen again after this period to determine whether further management is indicated.       
none
none

## 2022-07-12 NOTE — ED ADULT NURSE REASSESSMENT NOTE - GENERAL PATIENT STATE
comfortable appearance/cooperative
comfortable appearance/smiling/interactive
comfortable appearance/cooperative/smiling/interactive
comfortable appearance/cooperative/smiling/interactive
comfortable appearance/cooperative

## 2022-07-12 NOTE — PROGRESS NOTE ADULT - SUBJECTIVE AND OBJECTIVE BOX
HARSHIL ROBERTSON 47y [id number]    Patient is a 47y old  Male who presents with a chief complaint of     Follow Up/CC:  ID following for    Interval History/ROS:    Allergies    No Known Allergies    Intolerances        ANTIMICROBIALS:  vancomycin  IVPB 1000 every 8 hours      MEDICATIONS  (STANDING):  melatonin 5 milliGRAM(s) Oral at bedtime  vancomycin  IVPB 1000 milliGRAM(s) IV Intermittent every 8 hours    MEDICATIONS  (PRN):        Vital Signs Last 24 Hrs  T(C): 36.7 (12 Jul 2022 07:43), Max: 37.3 (11 Jul 2022 20:03)  T(F): 98.1 (12 Jul 2022 07:43), Max: 99.1 (11 Jul 2022 20:03)  HR: 73 (12 Jul 2022 07:43) (72 - 84)  BP: 142/90 (12 Jul 2022 07:43) (126/84 - 148/91)  BP(mean): --  RR: 19 (12 Jul 2022 07:43) (18 - 19)  SpO2: 98% (12 Jul 2022 07:43) (97% - 99%)    Parameters below as of 12 Jul 2022 07:43  Patient On (Oxygen Delivery Method): room air        CBC Full  -  ( 12 Jul 2022 06:34 )  WBC Count : 7.90 K/uL  RBC Count : 4.75 M/uL  Hemoglobin : 14.0 g/dL  Hematocrit : 41.3 %  Platelet Count - Automated : 322 K/uL  Mean Cell Volume : 86.9 fl  Mean Cell Hemoglobin : 29.5 pg  Mean Cell Hemoglobin Concentration : 33.9 gm/dL  Auto Neutrophil # : 5.16 K/uL  Auto Lymphocyte # : 1.70 K/uL  Auto Monocyte # : 0.72 K/uL  Auto Eosinophil # : 0.22 K/uL  Auto Basophil # : 0.05 K/uL  Auto Neutrophil % : 65.4 %  Auto Lymphocyte % : 21.5 %  Auto Monocyte % : 9.1 %  Auto Eosinophil % : 2.8 %  Auto Basophil % : 0.6 %    07-12    139  |  101  |  13  ----------------------------<  110<H>  4.4   |  25  |  0.74    Ca    9.5      12 Jul 2022 06:34    TPro  7.6  /  Alb  4.6  /  TBili  0.4  /  DBili  x   /  AST  24  /  ALT  18  /  AlkPhos  68  07-11    LIVER FUNCTIONS - ( 11 Jul 2022 03:35 )  Alb: 4.6 g/dL / Pro: 7.6 g/dL / ALK PHOS: 68 U/L / ALT: 18 U/L / AST: 24 U/L / GGT: x               MICROBIOLOGY:          v            RADIOLOGY     HARSHIL ROBERTSON 47y [id number]    Patient is a 47y old  Male who presents with a chief complaint of     Follow Up/CC:  ID following for right wrist cellulitis     Interval History/ROS: no fever, arm feels better     Allergies    No Known Allergies    Intolerances        ANTIMICROBIALS:  vancomycin  IVPB 1000 every 8 hours      MEDICATIONS  (STANDING):  melatonin 5 milliGRAM(s) Oral at bedtime  vancomycin  IVPB 1000 milliGRAM(s) IV Intermittent every 8 hours    MEDICATIONS  (PRN):        Vital Signs Last 24 Hrs  T(C): 36.7 (12 Jul 2022 07:43), Max: 37.3 (11 Jul 2022 20:03)  T(F): 98.1 (12 Jul 2022 07:43), Max: 99.1 (11 Jul 2022 20:03)  HR: 73 (12 Jul 2022 07:43) (72 - 84)  BP: 142/90 (12 Jul 2022 07:43) (126/84 - 148/91)  BP(mean): --  RR: 19 (12 Jul 2022 07:43) (18 - 19)  SpO2: 98% (12 Jul 2022 07:43) (97% - 99%)    Parameters below as of 12 Jul 2022 07:43  Patient On (Oxygen Delivery Method): room air        CBC Full  -  ( 12 Jul 2022 06:34 )  WBC Count : 7.90 K/uL  RBC Count : 4.75 M/uL  Hemoglobin : 14.0 g/dL  Hematocrit : 41.3 %  Platelet Count - Automated : 322 K/uL  Mean Cell Volume : 86.9 fl  Mean Cell Hemoglobin : 29.5 pg  Mean Cell Hemoglobin Concentration : 33.9 gm/dL  Auto Neutrophil # : 5.16 K/uL  Auto Lymphocyte # : 1.70 K/uL  Auto Monocyte # : 0.72 K/uL  Auto Eosinophil # : 0.22 K/uL  Auto Basophil # : 0.05 K/uL  Auto Neutrophil % : 65.4 %  Auto Lymphocyte % : 21.5 %  Auto Monocyte % : 9.1 %  Auto Eosinophil % : 2.8 %  Auto Basophil % : 0.6 %    07-12    139  |  101  |  13  ----------------------------<  110<H>  4.4   |  25  |  0.74    Ca    9.5      12 Jul 2022 06:34    TPro  7.6  /  Alb  4.6  /  TBili  0.4  /  DBili  x   /  AST  24  /  ALT  18  /  AlkPhos  68  07-11    LIVER FUNCTIONS - ( 11 Jul 2022 03:35 )  Alb: 4.6 g/dL / Pro: 7.6 g/dL / ALK PHOS: 68 U/L / ALT: 18 U/L / AST: 24 U/L / GGT: x               MICROBIOLOGY:        RADIOLOGY    < from: Xray Chest 1 View- PORTABLE-Urgent (Xray Chest 1 View- PORTABLE-Urgent .) (07.11.22 @ 07:33) >  Clear lungs.    < end of copied text >

## 2022-07-12 NOTE — ED CDU PROVIDER SUBSEQUENT DAY NOTE - PROGRESS NOTE DETAILS
Attending MD Contreras: Patient seen by Dr. Alexis of ID, recommends awaiting culture for conversion to po antibiotics.  Will keep on IV Vanco now and await final ID recs. Vanc trough results.   Tried to page ID 3x but have not heard back. Contact ID fellow via Teams, waiting for response.   George Sanabria PA-C Vanc trough levels resulted   Tried to page ID 3x but have not heard back. Contact ID fellow via Teams, waiting for response.   Called ID fellow via Teams, states she needs to contact pharmacy in regards to Vanc and will page back  George Sanabria PA-C Spoke with ID, recommending additional 500mg IV Vanc dose now and to continue pt on 1250mg IV Vanc q8hrs. Vanc trough after 4th level after adjustment.  George Sanabria PA-C CDU PROGRESS NOTE ADAM BOOTHE: Received pt at 1900 sign-out. Case/plan reviewed. VSS. Exam+ faint Erythema over surgical site and up the forearm Not spreading pass marked lines. No discharge. Pt states feeling better, declines analgesia now. ID recs appreciated, cont vancomycin 1 gm iv q8, f/u blood and wound cx, cont another day of iv abx.

## 2022-07-12 NOTE — ED ADULT NURSE REASSESSMENT NOTE - COMFORT CARE
meal provided/plan of care explained
meal provided/plan of care explained/wait time explained
darkened lights/plan of care explained
meal provided/plan of care explained
darkened lights/plan of care explained

## 2022-07-12 NOTE — ED ADULT NURSE REASSESSMENT NOTE - NS ED NURSE REASSESS COMMENT FT1
Pt received from ANNIE Capps. Pt oriented to CDU & plan of care was discussed. Pt A&O x 4. Pt in CDU for IV abx, ID cx, Ortho/hand following. Pt has wound to right wrist, area covered with dressing. Pt denies any right arm pain, chills, or fever. V/S stable, pt afebrile,  IV in place, patent and free of signs of infiltration. Pt resting in bed. Safety & comfort measures maintained. Call bell in reach. Will continue to monitor.

## 2022-07-12 NOTE — PROGRESS NOTE ADULT - SUBJECTIVE AND OBJECTIVE BOX
Patient seen and examined at bedside. Reports no acute complaints at this time. States pain is improved from yesterday and well controlled. No acute events overnight.    PHYSICAL EXAM:  Vital Signs Last 24 Hrs  T(C): 36.5 (12 Jul 2022 04:26), Max: 37.3 (11 Jul 2022 20:03)  T(F): 97.7 (12 Jul 2022 04:26), Max: 99.1 (11 Jul 2022 20:03)  HR: 84 (12 Jul 2022 04:26) (72 - 84)  BP: 132/88 (12 Jul 2022 04:26) (126/84 - 148/91)  BP(mean): --  RR: 18 (12 Jul 2022 04:26) (18 - 18)  SpO2: 98% (12 Jul 2022 04:26) (97% - 99%)    Parameters below as of 12 Jul 2022 04:26  Patient On (Oxygen Delivery Method): room air    Gen: NAD, AAOx3    Right Upper Extremity:  Surgical site of the volar aspect of the wrist open with No exposed bone or tendon noted. No expressible fluid present  Slight Erythema over surgical site and up the forearm with notable swelling to the volar forearm.  No deep space infection to the hand, no erythema to the hand.   +AIN/PIN/Radial Nerve/Median Nerve/Ulnar Nerve/Musculocutaneous Nerve.   Moving all fingers; full flexion/extension at wrist, MCPs and all IP joints.   Sensation intact.   Radial pulse 2+, cap refill <2secs.

## 2022-07-12 NOTE — ED ADULT NURSE REASSESSMENT NOTE - NS ED NURSE REASSESS COMMENT FT1
Report received from ANA hart  Pt AAOx4, NAD, resp nonlabored, skin warm/dry, resting comfortably in bed, no complaints at this time.  Pt denies headache, dizziness, chest pain, palpitations, SOB, abd pain, n/v/d, urinary symptoms, fevers, chills, weakness at this time.. Safety maintained with call bell within reach.

## 2022-07-13 VITALS
DIASTOLIC BLOOD PRESSURE: 90 MMHG | SYSTOLIC BLOOD PRESSURE: 136 MMHG | TEMPERATURE: 98 F | HEART RATE: 76 BPM | OXYGEN SATURATION: 99 %

## 2022-07-13 LAB
-  AMPICILLIN/SULBACTAM: SIGNIFICANT CHANGE UP
-  CEFAZOLIN: SIGNIFICANT CHANGE UP
-  CLINDAMYCIN: SIGNIFICANT CHANGE UP
-  ERYTHROMYCIN: SIGNIFICANT CHANGE UP
-  GENTAMICIN: SIGNIFICANT CHANGE UP
-  OXACILLIN: SIGNIFICANT CHANGE UP
-  PENICILLIN: SIGNIFICANT CHANGE UP
-  RIFAMPIN: SIGNIFICANT CHANGE UP
-  TETRACYCLINE: SIGNIFICANT CHANGE UP
-  TRIMETHOPRIM/SULFAMETHOXAZOLE: SIGNIFICANT CHANGE UP
-  VANCOMYCIN: SIGNIFICANT CHANGE UP
CULTURE RESULTS: SIGNIFICANT CHANGE UP
METHOD TYPE: SIGNIFICANT CHANGE UP
ORGANISM # SPEC MICROSCOPIC CNT: SIGNIFICANT CHANGE UP
ORGANISM # SPEC MICROSCOPIC CNT: SIGNIFICANT CHANGE UP
SPECIMEN SOURCE: SIGNIFICANT CHANGE UP

## 2022-07-13 PROCEDURE — 87640 STAPH A DNA AMP PROBE: CPT

## 2022-07-13 PROCEDURE — 85730 THROMBOPLASTIN TIME PARTIAL: CPT

## 2022-07-13 PROCEDURE — 99217: CPT

## 2022-07-13 PROCEDURE — 82803 BLOOD GASES ANY COMBINATION: CPT

## 2022-07-13 PROCEDURE — 73090 X-RAY EXAM OF FOREARM: CPT

## 2022-07-13 PROCEDURE — 96376 TX/PRO/DX INJ SAME DRUG ADON: CPT

## 2022-07-13 PROCEDURE — 85014 HEMATOCRIT: CPT

## 2022-07-13 PROCEDURE — 96375 TX/PRO/DX INJ NEW DRUG ADDON: CPT

## 2022-07-13 PROCEDURE — 96367 TX/PROPH/DG ADDL SEQ IV INF: CPT

## 2022-07-13 PROCEDURE — 85652 RBC SED RATE AUTOMATED: CPT

## 2022-07-13 PROCEDURE — 99282 EMERGENCY DEPT VISIT SF MDM: CPT

## 2022-07-13 PROCEDURE — 99284 EMERGENCY DEPT VISIT MOD MDM: CPT | Mod: 25

## 2022-07-13 PROCEDURE — 85610 PROTHROMBIN TIME: CPT

## 2022-07-13 PROCEDURE — 10180 I&D COMPLEX PO WOUND INFCTJ: CPT

## 2022-07-13 PROCEDURE — 96365 THER/PROPH/DIAG IV INF INIT: CPT

## 2022-07-13 PROCEDURE — 85025 COMPLETE CBC W/AUTO DIFF WBC: CPT

## 2022-07-13 PROCEDURE — 71045 X-RAY EXAM CHEST 1 VIEW: CPT

## 2022-07-13 PROCEDURE — 87635 SARS-COV-2 COVID-19 AMP PRB: CPT

## 2022-07-13 PROCEDURE — 86850 RBC ANTIBODY SCREEN: CPT

## 2022-07-13 PROCEDURE — 83605 ASSAY OF LACTIC ACID: CPT

## 2022-07-13 PROCEDURE — 87077 CULTURE AEROBIC IDENTIFY: CPT

## 2022-07-13 PROCEDURE — 73110 X-RAY EXAM OF WRIST: CPT

## 2022-07-13 PROCEDURE — 82435 ASSAY OF BLOOD CHLORIDE: CPT

## 2022-07-13 PROCEDURE — G0378: CPT

## 2022-07-13 PROCEDURE — 80053 COMPREHEN METABOLIC PANEL: CPT

## 2022-07-13 PROCEDURE — 84295 ASSAY OF SERUM SODIUM: CPT

## 2022-07-13 PROCEDURE — 86140 C-REACTIVE PROTEIN: CPT

## 2022-07-13 PROCEDURE — 86901 BLOOD TYPING SEROLOGIC RH(D): CPT

## 2022-07-13 PROCEDURE — 87070 CULTURE OTHR SPECIMN AEROBIC: CPT

## 2022-07-13 PROCEDURE — 87186 SC STD MICRODIL/AGAR DIL: CPT

## 2022-07-13 PROCEDURE — 87040 BLOOD CULTURE FOR BACTERIA: CPT

## 2022-07-13 PROCEDURE — 80048 BASIC METABOLIC PNL TOTAL CA: CPT

## 2022-07-13 PROCEDURE — 87641 MR-STAPH DNA AMP PROBE: CPT

## 2022-07-13 PROCEDURE — 84132 ASSAY OF SERUM POTASSIUM: CPT

## 2022-07-13 PROCEDURE — 82947 ASSAY GLUCOSE BLOOD QUANT: CPT

## 2022-07-13 PROCEDURE — 96366 THER/PROPH/DIAG IV INF ADDON: CPT

## 2022-07-13 PROCEDURE — 86900 BLOOD TYPING SEROLOGIC ABO: CPT

## 2022-07-13 PROCEDURE — 82330 ASSAY OF CALCIUM: CPT

## 2022-07-13 PROCEDURE — 85018 HEMOGLOBIN: CPT

## 2022-07-13 PROCEDURE — 80202 ASSAY OF VANCOMYCIN: CPT

## 2022-07-13 PROCEDURE — 36415 COLL VENOUS BLD VENIPUNCTURE: CPT

## 2022-07-13 RX ORDER — CEPHALEXIN 500 MG
1 CAPSULE ORAL
Qty: 28 | Refills: 0
Start: 2022-07-13 | End: 2022-07-19

## 2022-07-13 RX ORDER — CEFAZOLIN SODIUM 1 G
1000 VIAL (EA) INJECTION ONCE
Refills: 0 | Status: COMPLETED | OUTPATIENT
Start: 2022-07-13 | End: 2022-07-13

## 2022-07-13 RX ADMIN — Medication 166.67 MILLIGRAM(S): at 06:29

## 2022-07-13 RX ADMIN — Medication 100 MILLIGRAM(S): at 09:46

## 2022-07-13 NOTE — ED CDU PROVIDER SUBSEQUENT DAY NOTE - ATTENDING APP SHARED VISIT CONTRIBUTION OF CARE
Aly Kendall MD:   I personally saw the patient and performed a substantive portion of the visit including all aspects of the medical decision making.    MDM: 47 M w/ carpal tunnel release on 6/28/22 in [place] who presents with R wrist swelling, purulent discharge and tracking redness up to the elbow.   In the ED, pt had labs including inflammatory markers. XR performed. Ortho/Hand performed I&D.   Patient was placed in the CDU for IV antibiotics, Ortho-Hand, and ID consultation.   Patient was evaluated by me in the morning, and reports nearly resolved symptoms. The redness has regressed and mostly resolved of the RUE. NVI distally. No purulent drainage from site. Pt was seen and cleared by both ID and Ortho-Hand. Infectious disease consultatant rec'd Keflex and to d/c the Vanc and does not need doxy/bactrim as not likely MRSA. Stable for discharge with close follow-up and strict return precautions. The patient has been informed of all concerning signs and symptoms to return to Emergency Department, the necessity to follow up with Ortho-Hand Dr. Love in 2-3 days, PMD within 3-5 days was explained, and the patient reports understanding of above with capacity and insight.

## 2022-07-13 NOTE — ED CDU PROVIDER SUBSEQUENT DAY NOTE - NS ED ATTENDING STATEMENT MOD
This was a shared visit with the NICHOLAS. I reviewed and verified the documentation and independently performed the documented:
This was a shared visit with the NICHOLAS. I reviewed and verified the documentation and independently performed the documented:

## 2022-07-13 NOTE — PROGRESS NOTE ADULT - ASSESSMENT
A/P: 47M w/ R Carpal Tunnel SSI s/p Bedside I&D  Analgesia  DVT ppx  NWB RUE  IV Abx   ID Recs appreciated  PT/OT  Encourage incentive spirometry  Surgical area appears to display improvement from yesterday following bedside I&D  Continue to monitor on IV Abx   Will discuss with attending and advise if plan changes 
A/P: 47M w/ R Carpal Tunnel SSI s/p Bedside I&D  Analgesia  DVT ppx  NWB RUE  IV Abx   ID Recs appreciated  PT/OT  Encourage incentive spirometry  Surgical area appears to display improvement from yesterday following bedside I&D  Will discuss with attending and advise if plan changes 
Patient is a 47y old male with no previous PMHx presents with a chief complaint of erythema, swelling and purulent discharge from the site of the carpal tunnel surgery incision site of his right arm that was done in [place] 6/28/22.     s/p right wrist carpal tunnel surgery with postop wound infection, right forearm cellulitis   -cont vancomycin 1 gm iv q8   -f/u blood and wound cx  -not septic  -local care per ortho  -cont another day of iv abx   -improving     Jaron Alexis  Attending Physician   Division of Infectious Disease  Office #[phone]  Available on [employer] Teams also  After 5pm/weekend or no response, call #[phone]  
Patient is a 47y old male with no previous PMHx presents with a chief complaint of erythema, swelling and purulent discharge from the site of the carpal tunnel surgery incision site of his right arm that was done in [place] 6/28/22.     s/p right wrist carpal tunnel surgery with postop wound infection, right forearm cellulitis, staph aures infection   -change abx to keflex 500 mg po Q6x 7 days   -improved  -potential side effects of abx explained including GI, Cdiff, allergy issues, development of resistance etc.  -f/u with surgeon in [place]   -NH planning     Jaron Alexis  Attending Physician   Division of Infectious Disease  Office #[phone]  Available on [employer] Teams also  After 5pm/weekend or no response, call #[phone]

## 2022-07-13 NOTE — ED CDU PROVIDER SUBSEQUENT DAY NOTE - PROGRESS NOTE DETAILS
CDU NOTE ADAM Flaherty: pt resting comfortably, feels much better. no complaints. NAD recent VSS.   pt seen by ID attending- recommending outpatient f/up with hand specialist, po keflex x 1 week  spoke with ortho- ok with d/c home on keflex as recommended by Hand, pt can f/up with Dr. Love within 1 week if still in [place] otherwise with his Hand surgeon in [place].  as per Dr. Kendall, pt stable for d/c home

## 2022-07-13 NOTE — ED CDU PROVIDER SUBSEQUENT DAY NOTE - HISTORY
CDU PROGRESS NOTE PA SHYANN: Pt resting in stretcher, VSS, NAD. No interval change from prior exam. +Erythema over surgical site and up the forearm with notable swelling to the volar and dorsal aspects of the forearm. Not spreading pass marked lines. No discharge. Pt declines analgesia now. WIll continue vancomycin 1 gm iv q8 for cover mrsa/strep, f/u blood and wound cx.
CDU PROGRESS NOTE PA SHYANN: Pt resting in stretcher, Exam+ faint Erythema improving over surgical site and up the forearm Not spreading pass marked lines. No discharge. Wound cleaned, dressing changed. Will cont vancomycin 1 gm iv q8, f/u blood and wound cx.

## 2022-07-13 NOTE — PROGRESS NOTE ADULT - RESPIRATORY
clear to auscultation bilaterally/no wheezes/no respiratory distress
clear to auscultation bilaterally/no wheezes/no respiratory distress

## 2022-07-13 NOTE — ED CDU PROVIDER SUBSEQUENT DAY NOTE - NSICDXPASTSURGICALHX_GEN_ALL_CORE_FT
PAST SURGICAL HISTORY:  S/P surgery on nasal septum     
PAST SURGICAL HISTORY:  S/P surgery on nasal septum

## 2022-07-13 NOTE — ED CDU PROVIDER SUBSEQUENT DAY NOTE - NSICDXFAMILYHX_GEN_ALL_CORE_FT
FAMILY HISTORY:  Grandparent  Still living? Unknown  Family history of diabetes mellitus (DM), Age at diagnosis: Age Unknown    
FAMILY HISTORY:  Grandparent  Still living? Unknown  Family history of diabetes mellitus (DM), Age at diagnosis: Age Unknown

## 2022-07-13 NOTE — PROGRESS NOTE ADULT - PSYCHIATRIC
normal affect/alert and oriented x3/normal behavior
normal affect/alert and oriented x3/normal behavior

## 2022-07-13 NOTE — PROGRESS NOTE ADULT - SUBJECTIVE AND OBJECTIVE BOX
Patient seen and examined at bedside. Reports no acute complaints at this time. States pain is improved from yesterday and well controlled. No acute events overnight.    Vital Signs Last 24 Hrs  T(C): 36.5 (13 Jul 2022 04:48), Max: 36.8 (12 Jul 2022 15:28)  T(F): 97.7 (13 Jul 2022 04:48), Max: 98.3 (12 Jul 2022 20:00)  HR: 83 (13 Jul 2022 04:48) (57 - 84)  BP: 138/89 (13 Jul 2022 04:48) (136/99 - 148/93)  BP(mean): --  RR: 18 (13 Jul 2022 04:48) (18 - 19)  SpO2: 99% (13 Jul 2022 04:48) (96% - 100%)    Parameters below as of 13 Jul 2022 04:48  Patient On (Oxygen Delivery Method): room air        Gen: NAD, AAOx3    Right Upper Extremity:  Surgical site of the volar aspect of the wrist open with No exposed bone or tendon noted. No expressible fluid present  Slight Erythema over surgical site decreased erythema over forearm  No deep space infection to the hand, no erythema to the hand.   +AIN/PIN/Radial Nerve/Median Nerve/Ulnar Nerve/Musculocutaneous Nerve.   Moving all fingers; full flexion/extension at wrist, MCPs and all IP joints.   Sensation intact.   Radial pulse 2+, cap refill <2secs.

## 2022-07-13 NOTE — ED CDU PROVIDER SUBSEQUENT DAY NOTE - PHYSICAL EXAMINATION
GEN: Pt well appearing, non-toxic in NAD, A&Ox3.  PSYCH: Affect and mood appropriate.  EYES: Sclera white w/o injection.  ENT: Neck supple FROM. Airway patent.  RESP: CTA b/l, no wheezes, rales, or rhonchi.   CARDIAC: RRR, clear distinct S1, S2, no appreciable murmurs.  ABD: Abdomen soft, non-tender.  MSK: Moving all extremities.  NEURO: No focal motor or sensory deficits.  VASC: Radial and dorsalis pedis pulses 2+ b/l. No edema or calf tenderness.  SKIN: RUE open I&D incisional wound whyte aspect of wrist w/ surrounding induration and erythema to palm of hand and up forearm not crossing the AC fossa. Incision small amount of dry blood.
GEN: Pt well appearing, non-toxic in NAD, A&Ox3.  PSYCH: Affect and mood appropriate.  EYES: Sclera white w/o injection.  ENT: Neck supple FROM. Airway patent.  RESP: CTA b/l, no wheezes, rales, or rhonchi.   CARDIAC: RRR, clear distinct S1, S2, no appreciable murmurs.  ABD: Abdomen soft, non-tender.  MSK: Moving all extremities.  NEURO: No focal motor or sensory deficits.  VASC: Radial and dorsalis pedis pulses 2+ b/l. No edema or calf tenderness.  SKIN: RUE open I&D incisional wound whyte aspect of wrist w/ surrounding induration and erythema to palm of hand and up forearm not crossing the AC fossa. Incision draining small amount of blood.

## 2022-07-13 NOTE — PROGRESS NOTE ADULT - SUBJECTIVE AND OBJECTIVE BOX
HARSHIL ROBERTSON 47y [id number]    Patient is a 47y old  Male who presents with a chief complaint of     Follow Up/CC:  ID following for wrist infection    Interval History/ROS: no fever    Allergies    No Known Allergies    Intolerances        ANTIMICROBIALS:  vancomycin  IVPB 1250 every 8 hours      MEDICATIONS  (STANDING):  melatonin 5 milliGRAM(s) Oral at bedtime  vancomycin  IVPB 1250 milliGRAM(s) IV Intermittent every 8 hours    MEDICATIONS  (PRN):        Vital Signs Last 24 Hrs  T(C): 36.6 (13 Jul 2022 08:19), Max: 36.8 (12 Jul 2022 15:28)  T(F): 97.8 (13 Jul 2022 08:19), Max: 98.3 (12 Jul 2022 20:00)  HR: 76 (13 Jul 2022 08:19) (57 - 84)  BP: 136/90 (13 Jul 2022 08:19) (136/90 - 148/93)  BP(mean): --  RR: 18 (13 Jul 2022 04:48) (18 - 19)  SpO2: 99% (13 Jul 2022 08:19) (96% - 100%)    Parameters below as of 13 Jul 2022 08:19  Patient On (Oxygen Delivery Method): room air        CBC Full  -  ( 12 Jul 2022 06:34 )  WBC Count : 7.90 K/uL  RBC Count : 4.75 M/uL  Hemoglobin : 14.0 g/dL  Hematocrit : 41.3 %  Platelet Count - Automated : 322 K/uL  Mean Cell Volume : 86.9 fl  Mean Cell Hemoglobin : 29.5 pg  Mean Cell Hemoglobin Concentration : 33.9 gm/dL  Auto Neutrophil # : 5.16 K/uL  Auto Lymphocyte # : 1.70 K/uL  Auto Monocyte # : 0.72 K/uL  Auto Eosinophil # : 0.22 K/uL  Auto Basophil # : 0.05 K/uL  Auto Neutrophil % : 65.4 %  Auto Lymphocyte % : 21.5 %  Auto Monocyte % : 9.1 %  Auto Eosinophil % : 2.8 %  Auto Basophil % : 0.6 %    07-12    139  |  101  |  13  ----------------------------<  110<H>  4.4   |  25  |  0.74    Ca    9.5      12 Jul 2022 06:34            MICROBIOLOGY:  Wound Wound  07-11-22   Numerous Staphylococcus aureus  --  --      .Blood Blood  07-11-22   No growth to date.  --  --      .Blood Blood-Peripheral  07-11-22   No growth to date.  --  --      Vancomycin Level, Trough: 6.5 ug/mL (07-12-22 @ 13:00)    RADIOLOGY    < from: Xray Chest 1 View- PORTABLE-Urgent (Xray Chest 1 View- PORTABLE-Urgent .) (07.11.22 @ 07:33) >  Clear lungs.    < end of copied text >   HARSHIL ROBERTSON 47y [id number]    Patient is a 47y old  Male who presents with a chief complaint of     Follow Up/CC:  ID following for wrist infection    Interval History/ROS: no fever, feels better    Allergies    No Known Allergies    Intolerances        ANTIMICROBIALS:  vancomycin  IVPB 1250 every 8 hours      MEDICATIONS  (STANDING):  melatonin 5 milliGRAM(s) Oral at bedtime  vancomycin  IVPB 1250 milliGRAM(s) IV Intermittent every 8 hours    MEDICATIONS  (PRN):        Vital Signs Last 24 Hrs  T(C): 36.6 (13 Jul 2022 08:19), Max: 36.8 (12 Jul 2022 15:28)  T(F): 97.8 (13 Jul 2022 08:19), Max: 98.3 (12 Jul 2022 20:00)  HR: 76 (13 Jul 2022 08:19) (57 - 84)  BP: 136/90 (13 Jul 2022 08:19) (136/90 - 148/93)  BP(mean): --  RR: 18 (13 Jul 2022 04:48) (18 - 19)  SpO2: 99% (13 Jul 2022 08:19) (96% - 100%)    Parameters below as of 13 Jul 2022 08:19  Patient On (Oxygen Delivery Method): room air        CBC Full  -  ( 12 Jul 2022 06:34 )  WBC Count : 7.90 K/uL  RBC Count : 4.75 M/uL  Hemoglobin : 14.0 g/dL  Hematocrit : 41.3 %  Platelet Count - Automated : 322 K/uL  Mean Cell Volume : 86.9 fl  Mean Cell Hemoglobin : 29.5 pg  Mean Cell Hemoglobin Concentration : 33.9 gm/dL  Auto Neutrophil # : 5.16 K/uL  Auto Lymphocyte # : 1.70 K/uL  Auto Monocyte # : 0.72 K/uL  Auto Eosinophil # : 0.22 K/uL  Auto Basophil # : 0.05 K/uL  Auto Neutrophil % : 65.4 %  Auto Lymphocyte % : 21.5 %  Auto Monocyte % : 9.1 %  Auto Eosinophil % : 2.8 %  Auto Basophil % : 0.6 %    07-12    139  |  101  |  13  ----------------------------<  110<H>  4.4   |  25  |  0.74    Ca    9.5      12 Jul 2022 06:34            MICROBIOLOGY:  Wound Wound  07-11-22   Numerous Staphylococcus aureus  --  --      .Blood Blood  07-11-22   No growth to date.  --  --      .Blood Blood-Peripheral  07-11-22   No growth to date.  --  --      Vancomycin Level, Trough: 6.5 ug/mL (07-12-22 @ 13:00)    RADIOLOGY    < from: Xray Chest 1 View- PORTABLE-Urgent (Xray Chest 1 View- PORTABLE-Urgent .) (07.11.22 @ 07:33) >  Clear lungs.    < end of copied text >

## 2022-07-16 LAB
CULTURE RESULTS: SIGNIFICANT CHANGE UP
CULTURE RESULTS: SIGNIFICANT CHANGE UP
SPECIMEN SOURCE: SIGNIFICANT CHANGE UP
SPECIMEN SOURCE: SIGNIFICANT CHANGE UP

## 2022-09-06 ENCOUNTER — NON-APPOINTMENT (OUTPATIENT)
Age: 47
End: 2022-09-06